# Patient Record
Sex: FEMALE | Race: WHITE | NOT HISPANIC OR LATINO | Employment: FULL TIME | URBAN - METROPOLITAN AREA
[De-identification: names, ages, dates, MRNs, and addresses within clinical notes are randomized per-mention and may not be internally consistent; named-entity substitution may affect disease eponyms.]

---

## 2019-11-25 ENCOUNTER — OFFICE VISIT (OUTPATIENT)
Dept: OPHTHALMOLOGY | Facility: CLINIC | Age: 29
End: 2019-11-25
Payer: COMMERCIAL

## 2019-11-25 DIAGNOSIS — H47.333 PSEUDOPAPILLEDEMA OF BOTH OPTIC DISCS: Primary | ICD-10-CM

## 2019-11-25 PROCEDURE — 92004 PR EYE EXAM, NEW PATIENT,COMPREHESV: ICD-10-PCS | Mod: S$GLB,,, | Performed by: OPHTHALMOLOGY

## 2019-11-25 PROCEDURE — 99999 PR PBB SHADOW E&M-NEW PATIENT-LVL II: ICD-10-PCS | Mod: PBBFAC,,, | Performed by: OPHTHALMOLOGY

## 2019-11-25 PROCEDURE — 92004 COMPRE OPH EXAM NEW PT 1/>: CPT | Mod: S$GLB,,, | Performed by: OPHTHALMOLOGY

## 2019-11-25 PROCEDURE — 99999 PR PBB SHADOW E&M-NEW PATIENT-LVL II: CPT | Mod: PBBFAC,,, | Performed by: OPHTHALMOLOGY

## 2019-11-25 RX ORDER — TOPIRAMATE 50 MG/1
TABLET, FILM COATED ORAL
Refills: 2 | COMMUNITY
Start: 2019-11-20

## 2019-11-25 RX ORDER — LORAZEPAM 1 MG/1
TABLET ORAL
Refills: 0 | COMMUNITY
Start: 2019-10-07

## 2019-11-25 RX ORDER — DOXYCYCLINE HYCLATE 100 MG
100 TABLET ORAL DAILY
Refills: 0 | COMMUNITY
Start: 2019-10-16

## 2019-11-25 RX ORDER — PANTOPRAZOLE SODIUM 40 MG/1
40 TABLET, DELAYED RELEASE ORAL DAILY
Refills: 0 | COMMUNITY
Start: 2019-11-05

## 2019-11-25 RX ORDER — MONTELUKAST SODIUM 10 MG/1
TABLET ORAL
Refills: 2 | COMMUNITY
Start: 2019-11-11

## 2019-11-25 RX ORDER — ESCITALOPRAM OXALATE 10 MG/1
TABLET ORAL
Refills: 0 | COMMUNITY
Start: 2019-11-20

## 2019-11-25 RX ORDER — METHYLPREDNISOLONE 4 MG/1
TABLET ORAL
Refills: 0 | COMMUNITY
Start: 2019-10-16

## 2019-11-25 NOTE — LETTER
[facility] - Ophthalmology  [address]  [facility] [address]  Phone: [phone]  Fax: [phone]   November 25, 2019    Willie Alvares MD  [address] [facility] [place] [address]    Patient: Guicho Aranda   MR Number: [id number]   YOB: 1990   Date of Visit: 11/25/2019       Dear Dr. Alvares:    Thank you for referring Guicho Aranda to me for evaluation. Here is my assessment and plan of care:    Assessment:  /Plan     For exam results, see Encounter Report.    Pseudopapilledema of both optic discs      Ms. Almaraz optic discs are crowded yielding a pseudopapilledema pattern. The margins of the optic discs are sharp and the discs themselves are centrally elevated. I found no evidence of elevation of intracranial pressure. Ms. Aranda will return to Dr. Alvares for continued eye care and to me as requested.          Plan:  For exam results, see Encounter Report.    Pseudopapilledema of both optic discs      Ms. Almaraz optic discs are crowded yielding a pseudopapilledema pattern. The margins of the optic discs are sharp and the discs themselves are centrally elevated. I found no evidence of elevation of intracranial pressure. Ms. Aranda will return to Dr. Alvares for continued eye care and to me as requested.            Below you will find my full exam findings. If you have questions, please do not hesitate to call me. I look forward to following Ms. Guicho Aranda along with you.    Sincerely,          Eric Huffman MD       CC  No Recipients             Base Eye Exam     Visual Acuity (Snellen - Linear)       Right Left    Dist sc 20/25 20/25    Correction:  Glasses          Tonometry (Applanation, 1:49 PM)       Right Left    Pressure 18 18          Pupils       Dark Light Shape React APD    Right 5 3 Round Brisk None    Left 5 3 Round Brisk None          Visual Fields       Right Left     Full Full   HVF 11/4/2019 full, reliable in both eyes           Extraocular Movement        Right Left     Full, Ortho Full, Ortho          Neuro/Psych     Oriented x3:  Yes    Mood/Affect:  Normal          Dilation     Both eyes:  1% Mydriacyl, 2.5% Phenylephrine @ 1:51 PM            Slit Lamp and Fundus Exam     External Exam       Right Left    External Normal Normal          Slit Lamp Exam       Right Left    Lids/Lashes Normal Normal    Conjunctiva/Sclera White and quiet White and quiet    Cornea Subepithelial infiltrates Subepithelial infiltrates    Anterior Chamber Deep and quiet Deep and quiet    Iris Round and reactive Round and reactive    Lens Clear Clear    Vitreous Normal Normal          Fundus Exam       Right Left    Disc Crowded, no edema Crowded, no edema    C/D Ratio 0.0 0.05    Macula Normal Normal    Vessels Normal Normal    Periphery Normal Normal

## 2019-11-25 NOTE — PROGRESS NOTES
HPI     Refer by Dr. Alvares    Patient states she is here today for optic nerve evaluation. Patient   started having migraines in April of this year and in July was seen by her   optom for REE. Patient states on that visit it was discovered her optic   nerves were enlarged ou. Patient was then referred to Dr. Willie Alvares. Dr. Willie Alvares has been following her optic nerves and saw   an increase since initial visit. Patient has had an MRI and spinal tap   which there were no findings per pt. No pain. No VA changes ou. No   previous sx/injury ou.     No gtts    I have personally interviewed the patient, reviewed the history and   examined the patient and agree with the technician's exam.    No pulsatile tinnitus, diplopia, or transient visual obscurations. Had EKC   in July 2019 initially treated with steroid eye drops.    Last edited by Eric Huffman MD on 11/25/2019  1:51 PM. (History)            Assessment /Plan     For exam results, see Encounter Report.    Pseudopapilledema of both optic discs      Ms. Aranda's optic discs are crowded yielding a pseudopapilledema pattern. The margins of the optic discs are sharp and the discs themselves are centrally elevated. I found no evidence of elevation of intracranial pressure. Ms. Aranda will return to Dr. Alvares for continued eye care and to me as requested.